# Patient Record
Sex: FEMALE | Race: WHITE | Employment: STUDENT | URBAN - METROPOLITAN AREA
[De-identification: names, ages, dates, MRNs, and addresses within clinical notes are randomized per-mention and may not be internally consistent; named-entity substitution may affect disease eponyms.]

---
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
## 2024-01-16 ENCOUNTER — HOSPITAL ENCOUNTER (OUTPATIENT)
Dept: CV DIAGNOSTICS | Facility: HOSPITAL | Age: 19
Discharge: HOME | End: 2024-01-16
Attending: INTERNAL MEDICINE
Payer: MEDICAID

## 2024-01-16 DIAGNOSIS — R00.2 PALPITATIONS: ICD-10-CM

## 2024-01-16 DIAGNOSIS — I34.1 MVP (MITRAL VALVE PROLAPSE): ICD-10-CM

## 2024-01-16 PROCEDURE — 93306 TTE W/DOPPLER COMPLETE: CPT | Performed by: INTERNAL MEDICINE

## 2024-01-29 NOTE — H&P
The patient was seen and examined.     There was no change in the patient's clinical status from the date of the office note, to the date of their procedure at [facility]. Thus, the progress note is up-to-date.    Kyler Atkinson MD, FACC  2/7/2024  12:08 PM

## 2024-02-07 ENCOUNTER — HOSPITAL ENCOUNTER (OUTPATIENT)
Dept: CV DIAGNOSTICS | Facility: HOSPITAL | Age: 19
Discharge: HOME | End: 2024-02-07
Attending: INTERNAL MEDICINE
Payer: MEDICAID

## 2024-02-07 ENCOUNTER — HOSPITAL ENCOUNTER (OUTPATIENT)
Dept: INTERVENTIONAL RADIOLOGY/VASCULAR | Facility: HOSPITAL | Age: 19
Discharge: HOME | End: 2024-02-07
Attending: INTERNAL MEDICINE | Admitting: INTERNAL MEDICINE
Payer: MEDICAID

## 2024-02-07 VITALS
BODY MASS INDEX: 21.97 KG/M2 | OXYGEN SATURATION: 100 % | HEART RATE: 63 BPM | HEIGHT: 67 IN | SYSTOLIC BLOOD PRESSURE: 102 MMHG | TEMPERATURE: 98 F | WEIGHT: 140 LBS | DIASTOLIC BLOOD PRESSURE: 62 MMHG | RESPIRATION RATE: 11 BRPM

## 2024-02-07 DIAGNOSIS — I34.0 MITRAL VALVE INSUFFICIENCY, UNSPECIFIED ETIOLOGY: ICD-10-CM

## 2024-02-07 LAB — B-HCG UR QL: NEGATIVE

## 2024-02-07 PROCEDURE — 93325 DOPPLER ECHO COLOR FLOW MAPG: CPT | Performed by: INTERNAL MEDICINE

## 2024-02-07 PROCEDURE — 99152 MOD SED SAME PHYS/QHP 5/>YRS: CPT | Performed by: INTERNAL MEDICINE

## 2024-02-07 PROCEDURE — 4A02X9Z MEASUREMENT OF CARDIAC OUTPUT, EXTERNAL APPROACH: ICD-10-PCS | Performed by: INTERNAL MEDICINE

## 2024-02-07 PROCEDURE — 93312 ECHO TRANSESOPHAGEAL: CPT | Performed by: INTERNAL MEDICINE

## 2024-02-07 PROCEDURE — 81025 URINE PREGNANCY TEST: CPT

## 2024-02-07 PROCEDURE — 93320 DOPPLER ECHO COMPLETE: CPT | Performed by: INTERNAL MEDICINE

## 2024-02-07 PROCEDURE — 99153 MOD SED SAME PHYS/QHP EA: CPT | Performed by: INTERNAL MEDICINE

## 2024-02-07 PROCEDURE — B24BZZ4 ULTRASONOGRAPHY OF HEART WITH AORTA, TRANSESOPHAGEAL: ICD-10-PCS | Performed by: INTERNAL MEDICINE

## 2024-02-07 RX ORDER — MIDAZOLAM HYDROCHLORIDE 1 MG/ML
INJECTION INTRAMUSCULAR; INTRAVENOUS
Status: COMPLETED
Start: 2024-02-07 | End: 2024-02-07

## 2024-02-07 RX ORDER — MIDAZOLAM HYDROCHLORIDE 1 MG/ML
INJECTION INTRAMUSCULAR; INTRAVENOUS
Status: DISCONTINUED
Start: 2024-02-07 | End: 2024-02-07 | Stop reason: WASHOUT

## 2024-02-07 RX ORDER — SODIUM CHLORIDE 9 MG/ML
INJECTION, SOLUTION INTRAVENOUS
Status: DISCONTINUED | OUTPATIENT
Start: 2024-02-08 | End: 2024-02-07

## 2024-02-07 NOTE — PROGRESS NOTES
Pt post JAZMYNE  TO at 1220- after adequate sedation per Dr. Queen, JAZMYNE performed, vss. Pt tolerated well.    Pt awake.    Recovery complete per protocol. Vss, tolerating po intake. Discharge instruction reviewed, iv dc'd and pt discharged home with parent s driving.

## 2024-02-07 NOTE — PROCEDURES
PREOPERATIVE DIAGNOSIS: Mitral regurgitation    POSTOPERATIVE DIAGNOSIS: Mitral regurgitation    PROCEDURE PERFORMED: Transesophageal echocardiogram  (CPT code 79715) with Doppler echocardiography (CPT code 74827), color flow velocity mapping (CPT code 71515) and 3D rendering with interpretation and reporting of ultrasound; with image postprocessing under concurrent supervision; not requiring image post-processing on an independent workstation (CPT code 42723).     TECHNIQUE: The risks, benefits, and alternatives to transesophageal echocardiography were discussed with the patient.  The risks included, but were not limited to: dental trauma, respiratory failure, esophageal perforation and death.  The benefits of the procedure were evaluation for left atrial appendage closure.  Alternatives to the procedure included: not performing a transesophageal echocardiogram. The patient verbalized understanding and agreed to proceed with the procedure.     Benzocaine spray was applied to the oropharynx for local anesthesia. The patient was placed in the left lateral decubitus position.  A bite block was placed. Intravenous sedation was administered, with the patient monitored by continuous pulse oximetry and cardiac telemetry.    The IV was maintained by the RN and moderate conscious sedation with Versed and Fentanyl was given. The patient was assessed and monitoring of oxygen, heart rate and blood pressure by nurse (trained independent observer) and myself during the exam.    Sedation: 175 mcg of fentanyl; 10 mg of midazolam    Once adequate sedation was achieved, a lubricated transesophageal echocardiography probe was inserted orally.  It was smoothly advanced to the upper esophageal and mid esophageal positions.  Imaging was obtained.  It was then advanced to the deep gastric position.  Further imaging was obtained.  It was then slowly withdrawn orally.  No complications were noted at the end of the procedure.    3D  rendering with interpretation not requiring image postprocessing on an independent workstation with volume rendering, along with maximum intensity projections (MIPs) and quantitative analysis was performed.       2D AND 3D FINDINGS:     Mitral regurgitation severity and directionality: Moderate, centrally directed, late systolic MR. Two jets originating from the medial and lateral sides of A2P2 respectively.     Mitral valve morphology: Bileaflet prolapse    Mitral regurgitation effective regurgitant orifice area (by PISA method): 0.33 cm2    Mitral regurgitant volume (by PISA method): 28 mL    Pulmonary vein flow reversal: normal flow in RUPV and LUPV    Mitral valve gradient: 1 mm Hg at 74 beats per minute    Mitral valve area (by 3D planimetry): 6.4 cm2     Mitral valve annular distance: 2.8 cm    Posterior leaflet length: 0.14 cm     Significant incidental findings:  LV EF 60%     CONCLUSIONS:    1. There is moderate, centrally directed, late systolic MR. Two jets originating from the medial and lateral sides of A2P2 respectively. This is due to bileaflet prolapse. Normal pulmonary vein flow is noted in both upper pulmonary veins.   2. LV EF 60%.  3. Given the above findings, continue surveillance yearly echocardiography is recommended. These recommendations were communicated to the patient and her family.

## 2024-02-13 NOTE — H&P
Cardiology H&P      HPI:  Laly Enrique is a 18 year old female who is referred for evaluation of palpitations and dyspnea.    Referring physician: Terrell Arzola MD    Her mother has MVP (underwent went surgical repair; is my patient). She has MVP as well. She saw Dr. Valdovinos in 2018. He ordered an echo.    Echo in 2018 showed:  1. Left ventricle: The cavity size is normal. Wall thickness is normal.  Systolic function is normal. The estimated ejection fraction is 60-65%.  Wall motion is normal; there are no regional wall motion abnormalities.  Left ventricular diastolic function parameters are normal.  2. Mitral valve: Moderate prolapse, involving the anterior leaflet and the  posterior leaflet. There is moderate to severe regurgitation.  3. Left atrium: The atrium is mildly dilated.  4. Right ventricle: Estimation of the right ventricular systolic pressure is  within the normal range.  5. Pericardium, extracardiac: There is no significant pericardial effusion.    She has noted intermittent palpitations that have increased in frequency and intensity over the past few days. Her last episode lasted for a few hours.    She also now gets winded when she walks up four flights of stairs, which is new from a year ago.    She drinks a venti black tea daily.    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    HISTORY:  No past medical history on file.  Past Surgical History:  Procedure Laterality Date  CYST REMOVAL Right 2017  R cheek    Family History: There is no family history of premature coronary artery disease or sudden cardiac death.  Social History:  reports that she has never smoked. She has never used smokeless tobacco. She reports that she does not drink alcohol and does not use drugs.    Allergies:  No Known Allergies  Current Meds:  Current Outpatient Medications  Medication Sig Dispense Refill  Fluocinonide 0.05 % External Ointment Apply to AA on the hands BID x 2-4 weeks then PRN 60 g 1  triamcinolone acetonide 0.1 % External  Ointment Apply to aa on the BUE, BLE, left hand and chest BID x2-4 weeks, then prn for flaring (Patient not taking: Reported on 11/13/2020 ) 60 g 1  hydrocortisone 2.5 % External Cream Apply sparingly BID (Patient not taking: Reported on 11/13/2020 ) 60 g 0      ROS:  Constitutional: negative for fevers  Eyes: negative for visual disturbance  Ears, nose, mouth, throat, and face: negative for epistaxis  Respiratory: negative for dyspnea on exertion  Cardiovascular: negative for chest pain  Gastrointestinal: negative for melena  Genitourinary:negative for hematuria  Hematologic/lymphatic: negative for bleeding  Musculoskeletal:negative for myalgias  Neurological: negative for dizziness and headaches  Endocrine: negative for temperature intolerance    PHYSICAL EXAM:  104; 64; 98/60  General: Awake and alert; in no acute distress  HEENT: Extraocular movements are intact; sclerae are anicteric; scalp is atrauamatic; no thyromegaly  Neck: Supple; no JVD; no carotid bruits  Cardiac: Regular rate and regular rhythm; III/VI holoSM with click; PMI is non-displaced; there is no evidence of a sternal heave  Lungs: Clear to auscultation bilaterally; no accessory muscle use is noted  Abdomen: Soft, non-tender; bowel sounds are normoactive; no hepatosplenomegaly  Extremities: No clubbing or cyanosis; moves all 4 extremities normally  Psychiatric: Normal mood and affect; answers questions appropriately  Dermatologic: No rashes; normal skin turgor    LABORATORY VALUES:  Total Cholesterol (mg/dL)  Date Value  11/28/2018 131    HDL Cholesterol (mg/dL)  Date Value  11/28/2018 55 (A)    LDL Cholesterol (mg/dL)  Date Value  11/28/2018 54    No results found for: \"GLUCOSE\", \"POTASSIUM\", \"BUN\", \"CREATSERUM\"    ASSESSMENT AND PLAN:  (I34.1) MVP (mitral valve prolapse) (primary encounter diagnosis)  Plan: MODERATE-TO-SEVERE MR IN 2018  Repeat echo ([facility])    (R00.2) Palpitations  Plan: WORSENED  30-day event monitor ([facility]) and labs today.      Kyler Atkinson MD, FACC

## 2024-03-27 NOTE — PAT NURSING NOTE
PAT call with patient mother Josephine. The following instructions were given, questions answered and she verbalized understanding. Denies swallow problems, loose teeth, and Daniels's esophagitis.    Preprocedure Instructions    Visitor Instructions    Adult Patients: 2 Adult Care Partners can accompany the patient day of procedure. 2 Care Partners may visit [phone] during inpatient stay     PreOp Instructions    You are scheduled for: a Cardiac Procedure    Date of Procedure: 02/07/24    Diet Instructions: Do not eat or drink anything after midnight    Medications to Stop: Hold herbal supplements and vitamins       Arrival Time: You will receive a call the afternoon before your procedure after 3 pm on what time you should arrive the day of your procedure    Driving After Procedure: If sedation is given, you WILL NOT be able to drive home. You will need a responsible adult  to drive you home.    Discharge Teaching: Your nurse will give you specific instructions before discharge, Any questions, please call the physician's office       parking is available starting at 6 am or park in the [place] parking garage at [facility]. Check in at the [facility] reception desk. Our  will be there to check you in for your procedure. Please bring your insurance cards and ID with you.                                                                                                                                      Please DO NOT respond to this message, the inbasket is not monitored for messages. For any questions, please call the physician's office.   No

## (undated) DEVICE — SYRINGE 10ML LL CONTRL SYRINGE

## (undated) DEVICE — STANDARD HYPODERMIC NEEDLE,POLYPROPYLENE HUB: Brand: MONOJECT

## (undated) DEVICE — SUTURE PDS II 5-0 P-2

## (undated) DEVICE — GLOVE BIOGEL M SURG SZ 71/2

## (undated) DEVICE — OPHTHALMIC KNIFE 15°: Brand: ALCON

## (undated) DEVICE — HEAD AND NECK CDS-LF: Brand: MEDLINE INDUSTRIES, INC.

## (undated) DEVICE — SUTURE PROLENE 6-0 P-3

## (undated) DEVICE — KENDALL SCD EXPRESS SLEEVES, KNEE LENGTH, MEDIUM: Brand: KENDALL SCD

## (undated) DEVICE — CAUTERY NEEDLE 2IN INS E1465

## (undated) DEVICE — SOL  .9 1000ML BTL

## (undated) DEVICE — DENTAL SPONGES: Brand: DEROYAL

## (undated) DEVICE — PREP BETADINE SOL 5% EYE

## (undated) DEVICE — REM POLYHESIVE ADULT PATIENT RETURN ELECTRODE: Brand: VALLEYLAB

## (undated) NOTE — ED AVS SNAPSHOT
[facility] Emergency Department    [place]  [address] [address]    Phone:  [phone]    Fax:  [phone]           Demetrius Wiseman   MRN: [id number]    Department:  [facility] Emergency Department   Date of Visit:  1/1 IF THERE IS ANY CHANGE OR WORSENING OF YOUR CONDITION, CALL YOUR PRIMARY CARE PHYSICIAN AT ONCE OR RETURN IMMEDIATELY TO THE EMERGENCY DEPARTMENT.     If you have been prescribed any medication(s), please fill your prescription right away and begin taking t